# Patient Record
Sex: FEMALE | Race: ASIAN | NOT HISPANIC OR LATINO | URBAN - METROPOLITAN AREA
[De-identification: names, ages, dates, MRNs, and addresses within clinical notes are randomized per-mention and may not be internally consistent; named-entity substitution may affect disease eponyms.]

---

## 2021-12-22 ENCOUNTER — TELEPHONE (OUTPATIENT)
Dept: PAIN MEDICINE | Facility: CLINIC | Age: 76
End: 2021-12-22

## 2021-12-22 ENCOUNTER — TELEPHONE (OUTPATIENT)
Dept: PAIN MEDICINE | Facility: CLINIC | Age: 76
End: 2021-12-22
Payer: MEDICARE

## 2022-10-04 ENCOUNTER — HOSPITAL ENCOUNTER (OUTPATIENT)
Facility: HOSPITAL | Age: 77
Discharge: HOME | End: 2022-10-04
Attending: EMERGENCY MEDICINE | Admitting: STUDENT IN AN ORGANIZED HEALTH CARE EDUCATION/TRAINING PROGRAM
Payer: MEDICARE

## 2022-10-04 VITALS
HEIGHT: 65 IN | WEIGHT: 158.94 LBS | TEMPERATURE: 98 F | DIASTOLIC BLOOD PRESSURE: 85 MMHG | RESPIRATION RATE: 17 BRPM | HEART RATE: 96 BPM | BODY MASS INDEX: 26.48 KG/M2 | SYSTOLIC BLOOD PRESSURE: 175 MMHG | OXYGEN SATURATION: 96 %

## 2022-10-04 DIAGNOSIS — S06.5XAA SUBDURAL HEMATOMA: Primary | ICD-10-CM

## 2022-10-04 PROBLEM — G51.39 HEMIFACIAL SPASM: Status: ACTIVE | Noted: 2022-10-03

## 2022-10-04 PROBLEM — E11.9 DIABETES MELLITUS: Status: ACTIVE | Noted: 2022-10-04

## 2022-10-04 PROBLEM — H04.129 DRY EYE SYNDROME: Status: ACTIVE | Noted: 2022-10-04

## 2022-10-04 PROBLEM — G51.0 BELL'S PALSY: Status: ACTIVE | Noted: 2022-10-04

## 2022-10-04 LAB
ALBUMIN SERPL BCP-MCNC: 3.8 G/DL (ref 3.5–5.2)
ALP SERPL-CCNC: 83 U/L (ref 55–135)
ALT SERPL W/O P-5'-P-CCNC: 15 U/L (ref 10–44)
ANION GAP SERPL CALC-SCNC: 12 MMOL/L (ref 8–16)
AST SERPL-CCNC: 18 U/L (ref 10–40)
BASOPHILS # BLD AUTO: 0.03 K/UL (ref 0–0.2)
BASOPHILS NFR BLD: 0.3 % (ref 0–1.9)
BILIRUB SERPL-MCNC: 0.5 MG/DL (ref 0.1–1)
BUN SERPL-MCNC: 12 MG/DL (ref 8–23)
CALCIUM SERPL-MCNC: 8.9 MG/DL (ref 8.7–10.5)
CHLORIDE SERPL-SCNC: 102 MMOL/L (ref 95–110)
CO2 SERPL-SCNC: 25 MMOL/L (ref 23–29)
CREAT SERPL-MCNC: 0.8 MG/DL (ref 0.5–1.4)
DIFFERENTIAL METHOD: ABNORMAL
EOSINOPHIL # BLD AUTO: 0 K/UL (ref 0–0.5)
EOSINOPHIL NFR BLD: 0.2 % (ref 0–8)
ERYTHROCYTE [DISTWIDTH] IN BLOOD BY AUTOMATED COUNT: 12.6 % (ref 11.5–14.5)
EST. GFR  (NO RACE VARIABLE): >60 ML/MIN/1.73 M^2
GLUCOSE SERPL-MCNC: 135 MG/DL (ref 70–110)
HCT VFR BLD AUTO: 34.5 % (ref 37–48.5)
HCV AB SERPL QL IA: NORMAL
HGB BLD-MCNC: 11.6 G/DL (ref 12–16)
HIV 1+2 AB+HIV1 P24 AG SERPL QL IA: NORMAL
IMM GRANULOCYTES # BLD AUTO: 0.06 K/UL (ref 0–0.04)
IMM GRANULOCYTES NFR BLD AUTO: 0.6 % (ref 0–0.5)
INR PPP: 0.9 (ref 0.8–1.2)
LYMPHOCYTES # BLD AUTO: 1.3 K/UL (ref 1–4.8)
LYMPHOCYTES NFR BLD: 13.2 % (ref 18–48)
MCH RBC QN AUTO: 32.1 PG (ref 27–31)
MCHC RBC AUTO-ENTMCNC: 33.6 G/DL (ref 32–36)
MCV RBC AUTO: 96 FL (ref 82–98)
MONOCYTES # BLD AUTO: 0.5 K/UL (ref 0.3–1)
MONOCYTES NFR BLD: 5.2 % (ref 4–15)
NEUTROPHILS # BLD AUTO: 7.7 K/UL (ref 1.8–7.7)
NEUTROPHILS NFR BLD: 80.5 % (ref 38–73)
NRBC BLD-RTO: 0 /100 WBC
PLATELET # BLD AUTO: 315 K/UL (ref 150–450)
PMV BLD AUTO: 10.1 FL (ref 9.2–12.9)
POTASSIUM SERPL-SCNC: 4 MMOL/L (ref 3.5–5.1)
PROT SERPL-MCNC: 7 G/DL (ref 6–8.4)
PROTHROMBIN TIME: 9.8 SEC (ref 9–12.5)
RBC # BLD AUTO: 3.61 M/UL (ref 4–5.4)
SODIUM SERPL-SCNC: 139 MMOL/L (ref 136–145)
WBC # BLD AUTO: 9.5 K/UL (ref 3.9–12.7)

## 2022-10-04 PROCEDURE — G0378 HOSPITAL OBSERVATION PER HR: HCPCS

## 2022-10-04 PROCEDURE — 80053 COMPREHEN METABOLIC PANEL: CPT | Performed by: EMERGENCY MEDICINE

## 2022-10-04 PROCEDURE — 86803 HEPATITIS C AB TEST: CPT | Performed by: PHYSICIAN ASSISTANT

## 2022-10-04 PROCEDURE — 93005 ELECTROCARDIOGRAM TRACING: CPT

## 2022-10-04 PROCEDURE — 99218 PR INITIAL OBSERVATION CARE,LEVL I: CPT | Mod: GC,,, | Performed by: STUDENT IN AN ORGANIZED HEALTH CARE EDUCATION/TRAINING PROGRAM

## 2022-10-04 PROCEDURE — 85610 PROTHROMBIN TIME: CPT | Performed by: EMERGENCY MEDICINE

## 2022-10-04 PROCEDURE — 99285 PR EMERGENCY DEPT VISIT,LEVEL V: ICD-10-PCS | Mod: ,,, | Performed by: EMERGENCY MEDICINE

## 2022-10-04 PROCEDURE — 85025 COMPLETE CBC W/AUTO DIFF WBC: CPT | Performed by: EMERGENCY MEDICINE

## 2022-10-04 PROCEDURE — 99285 EMERGENCY DEPT VISIT HI MDM: CPT | Mod: 25

## 2022-10-04 PROCEDURE — 93010 EKG 12-LEAD: ICD-10-PCS | Mod: ,,, | Performed by: INTERNAL MEDICINE

## 2022-10-04 PROCEDURE — 99285 EMERGENCY DEPT VISIT HI MDM: CPT | Mod: ,,, | Performed by: EMERGENCY MEDICINE

## 2022-10-04 PROCEDURE — 87389 HIV-1 AG W/HIV-1&-2 AB AG IA: CPT | Performed by: PHYSICIAN ASSISTANT

## 2022-10-04 PROCEDURE — 99218 PR INITIAL OBSERVATION CARE,LEVL I: ICD-10-PCS | Mod: GC,,, | Performed by: STUDENT IN AN ORGANIZED HEALTH CARE EDUCATION/TRAINING PROGRAM

## 2022-10-04 PROCEDURE — 93010 ELECTROCARDIOGRAM REPORT: CPT | Mod: ,,, | Performed by: INTERNAL MEDICINE

## 2022-10-04 RX ORDER — METFORMIN HYDROCHLORIDE 1000 MG/1
1 TABLET ORAL 2 TIMES DAILY
COMMUNITY
Start: 2021-12-17 | End: 2022-12-16

## 2022-10-04 RX ORDER — TALC
6 POWDER (GRAM) TOPICAL NIGHTLY PRN
Status: DISCONTINUED | OUTPATIENT
Start: 2022-10-04 | End: 2022-10-04 | Stop reason: HOSPADM

## 2022-10-04 RX ORDER — AMLODIPINE BESYLATE 10 MG/1
10 TABLET ORAL DAILY
Status: DISCONTINUED | OUTPATIENT
Start: 2022-10-05 | End: 2022-10-04 | Stop reason: HOSPADM

## 2022-10-04 RX ORDER — AMLODIPINE BESYLATE 10 MG/1
1 TABLET ORAL DAILY
COMMUNITY
Start: 2021-12-17 | End: 2022-12-16

## 2022-10-04 RX ORDER — IBUPROFEN 200 MG
16 TABLET ORAL
Status: DISCONTINUED | OUTPATIENT
Start: 2022-10-04 | End: 2022-10-04 | Stop reason: HOSPADM

## 2022-10-04 RX ORDER — IBUPROFEN 200 MG
24 TABLET ORAL
Status: DISCONTINUED | OUTPATIENT
Start: 2022-10-04 | End: 2022-10-04 | Stop reason: HOSPADM

## 2022-10-04 RX ORDER — HYDRALAZINE HYDROCHLORIDE 20 MG/ML
10 INJECTION INTRAMUSCULAR; INTRAVENOUS EVERY 6 HOURS PRN
Status: DISCONTINUED | OUTPATIENT
Start: 2022-10-04 | End: 2022-10-04 | Stop reason: HOSPADM

## 2022-10-04 RX ORDER — CLONAZEPAM 1 MG/1
TABLET ORAL
COMMUNITY
Start: 2022-09-08 | End: 2023-03-06

## 2022-10-04 RX ORDER — CLONAZEPAM 1 MG/1
1 TABLET ORAL 3 TIMES DAILY PRN
Status: DISCONTINUED | OUTPATIENT
Start: 2022-10-04 | End: 2022-10-04 | Stop reason: HOSPADM

## 2022-10-04 RX ORDER — AMITRIPTYLINE HYDROCHLORIDE 50 MG/1
100 TABLET, FILM COATED ORAL NIGHTLY
Status: DISCONTINUED | OUTPATIENT
Start: 2022-10-04 | End: 2022-10-04 | Stop reason: HOSPADM

## 2022-10-04 RX ORDER — DOCUSATE SODIUM 100 MG/1
100 CAPSULE, LIQUID FILLED ORAL 2 TIMES DAILY
Status: DISCONTINUED | OUTPATIENT
Start: 2022-10-04 | End: 2022-10-04 | Stop reason: HOSPADM

## 2022-10-04 RX ORDER — LABETALOL HCL 20 MG/4 ML
10 SYRINGE (ML) INTRAVENOUS EVERY 6 HOURS PRN
Status: DISCONTINUED | OUTPATIENT
Start: 2022-10-04 | End: 2022-10-04 | Stop reason: HOSPADM

## 2022-10-04 RX ORDER — ACETAMINOPHEN 325 MG/1
650 TABLET ORAL EVERY 4 HOURS PRN
Status: DISCONTINUED | OUTPATIENT
Start: 2022-10-04 | End: 2022-10-04 | Stop reason: HOSPADM

## 2022-10-04 RX ORDER — GLUCAGON 1 MG
1 KIT INJECTION
Status: DISCONTINUED | OUTPATIENT
Start: 2022-10-04 | End: 2022-10-04 | Stop reason: HOSPADM

## 2022-10-04 RX ORDER — AMITRIPTYLINE HYDROCHLORIDE 100 MG/1
TABLET ORAL
COMMUNITY
Start: 2022-09-08 | End: 2023-09-07

## 2022-10-04 RX ORDER — NYSTATIN 100000 [USP'U]/G
POWDER TOPICAL
COMMUNITY
Start: 2022-09-08 | End: 2023-09-07

## 2022-10-04 RX ORDER — LEVETIRACETAM 500 MG/1
500 TABLET ORAL 2 TIMES DAILY
Qty: 60 TABLET | Refills: 2 | Status: SHIPPED | OUTPATIENT
Start: 2022-10-04 | End: 2023-10-04

## 2022-10-04 RX ORDER — ONDANSETRON 2 MG/ML
4 INJECTION INTRAMUSCULAR; INTRAVENOUS EVERY 6 HOURS PRN
Status: DISCONTINUED | OUTPATIENT
Start: 2022-10-04 | End: 2022-10-04 | Stop reason: HOSPADM

## 2022-10-04 RX ORDER — HYDROCHLOROTHIAZIDE 25 MG/1
TABLET ORAL
COMMUNITY
Start: 2022-06-08 | End: 2023-06-07

## 2022-10-04 RX ORDER — HEPARIN SODIUM 5000 [USP'U]/ML
5000 INJECTION, SOLUTION INTRAVENOUS; SUBCUTANEOUS EVERY 8 HOURS
Status: DISCONTINUED | OUTPATIENT
Start: 2022-10-05 | End: 2022-10-04 | Stop reason: HOSPADM

## 2022-10-04 RX ORDER — HYDROCHLOROTHIAZIDE 25 MG/1
25 TABLET ORAL DAILY
Status: DISCONTINUED | OUTPATIENT
Start: 2022-10-05 | End: 2022-10-04 | Stop reason: HOSPADM

## 2022-10-04 NOTE — SUBJECTIVE & OBJECTIVE
(Not in a hospital admission)      Review of patient's allergies indicates:   Allergen Reactions    Codeine Other (See Comments)     Per patient      Ketorolac Other (See Comments)     Reaction(s): Unknown      Sumatriptan Other (See Comments)     Reaction(s): Unknown         History reviewed. No pertinent past medical history.  History reviewed. No pertinent surgical history.  Family History    None       Tobacco Use    Smoking status: Not on file    Smokeless tobacco: Not on file   Substance and Sexual Activity    Alcohol use: Not on file    Drug use: Not on file    Sexual activity: Not on file     Review of Systems: see HPI for pertinent positives and negatives.   Objective:     Weight: 72.1 kg (158 lb 15.2 oz)  Body mass index is 26.45 kg/m².  Vital Signs (Most Recent):  Temp: 98.7 °F (37.1 °C) (10/04/22 1608)  Pulse: 92 (10/04/22 1501)  Resp: 18 (10/04/22 1330)  BP: (!) 159/80 (10/04/22 1501)  SpO2: 95 % (10/04/22 1501)   Vital Signs (24h Range):  Temp:  [97.4 °F (36.3 °C)-98.7 °F (37.1 °C)] 98.7 °F (37.1 °C)  Pulse:  [92-98] 92  Resp:  [18] 18  SpO2:  [95 %-98 %] 95 %  BP: (155-162)/(71-87) 159/80                          Physical Exam    Neurosurgery Physical Exam    GENERAL: resting comfortably  HEENT: NC, PERRL, mucous membranes moist  NECK: supple, trachea midline  CV: normal capillary refill  PULM: aerating well, symmetric expansion, no distress  ABD: soft, NT, ND  EXT: no c/c/e    NEURO:    AAO x 3  CN II-XII grossly intact except 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 **          All pertinent labs from  the last 24 hours have been reviewed.    Significant Diagnostics:  I have reviewed all pertinent imaging results/findings within the past 24 hours.  CT Head Without Contrast    Result Date: 10/4/2022  Subdural hemorrhage along the left side of the falx/tentorium.  The prior studies not available for direct comparison.  No significant midline shift or herniation. Electronically signed by: Percy Peace Date:    10/04/2022 Time:    13:00    CT Cervical Spine Without Contrast    Result Date: 10/4/2022  1. No fracture. 2. Degenerative changes of the cervical spine detailed above. 3. Multiple subcentimeter thyroid nodules. Electronically signed by: Jey Santiago MD Date:    10/04/2022 Time:    13:31

## 2022-10-04 NOTE — ED TRIAGE NOTES
Patient arrived via EMS as transfer from [facility]. Patient reports sleeping then suddenly waking up on the floor with blood on the floor. Patient also reports the swelling to the left side of the face and current state of speaking is her baseline.

## 2022-10-04 NOTE — ASSESSMENT & PLAN NOTE
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 CTH from OSH reviewed; there is a 6mm interhemispheric aSDH without overlying skull fracture. CTH obtained at [facility] approximately 6 hours later is grossly stable. CT Csp negative for acute process.     --Patient admitted to observation on telemetry      -q1h neurochecks in ICU, q2h neurochecks in stepdown, q4h neurochecks on floor  --All labs and diagnostics reviewed  --Hold anti-plt/coag medications  --SBP <140   --Na >135  --Keppra 500 BID  --HOB >30  --Follow-up full pre-op labs CBC/CMP/PT-INR/PTT/T&S  --Regular diet  --Continue to monitor clinically, notify NSGY immediately with any changes in neuro status    Dispo: Admit to obs

## 2022-10-04 NOTE — CONSULTS
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 Patient reportedly fell overnight while walking to the bathroom and struck her head on a bedside table. She is amnestic to the event, but denies LOC, fever/chills, HA, vision/hearing changes, dysphagia, dysarthria, nausea/vomiting, new-onset weakness or sensory change, bowel/bladder changes.  at bedside states pt got up at 4 am and was walking to the bathroom when she lost balance causing ehr to fall and hit her head on the dresser. CTH from OSH reviewed; there is a 6mm interhemispheric aSDH without overlying skull fracture. CTH obtained at [facility] approximately 6 hours later is grossly stable. CT Csp negative for acute process.       (Not in a hospital admission)      Review of patient's allergies indicates:   Allergen Reactions    Codeine Other (See Comments)     Per patient      Ketorolac Other (See Comments)     Reaction(s): Unknown      Sumatriptan Other (See Comments)     Reaction(s): Unknown         History reviewed. No pertinent past medical history.  History reviewed. No pertinent surgical history.  Family History    None       Tobacco Use    Smoking status: Not on file    Smokeless tobacco: Not on file   Substance and Sexual Activity    Alcohol use: Not on file    Drug use: Not on file    Sexual activity: Not on file     Review of Systems: see HPI for pertinent positives and negatives.   Objective:     Weight: 72.1 kg (158 lb 15.2 oz)  Body mass index is 26.45 kg/m².  Vital Signs (Most Recent):  Temp: 98.7 °F (37.1 °C) (10/04/22 1608)  Pulse: 92 (10/04/22 1501)  Resp: 18 (10/04/22 1330)  BP: (!) 159/80 (10/04/22 1501)  SpO2: 95 %  (10/04/22 1501)   Vital Signs (24h Range):  Temp:  [97.4 °F (36.3 °C)-98.7 °F (37.1 °C)] 98.7 °F (37.1 °C)  Pulse:  [92-98] 92  Resp:  [18] 18  SpO2:  [95 %-98 %] 95 %  BP: (155-162)/(71-87) 159/80                          Physical Exam    Neurosurgery Physical Exam    GENERAL: resting comfortably  HEENT: NC, PERRL, mucous membranes moist  NECK: supple, trachea midline  CV: normal capillary refill  PULM: aerating well, symmetric expansion, no distress  ABD: soft, NT, ND  EXT: no c/c/e    NEURO:    AAO x 3  CN II-XII grossly intact except 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 **          All pertinent labs from the last 24 hours have been reviewed.    Significant Diagnostics:  I have reviewed all pertinent imaging results/findings within the past 24 hours.  CT Head Without Contrast    Result Date: 10/4/2022  Subdural hemorrhage along the left side of the falx/tentorium.  The prior studies not available for direct comparison.  No significant midline shift or herniation. Electronically signed by: Percy Peace Date:    10/04/2022 Time:    13:00    CT Cervical Spine Without Contrast    Result Date: 10/4/2022  1. No fracture. 2. Degenerative changes of the cervical spine detailed above. 3. Multiple subcentimeter thyroid nodules. 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 CTH from OSH reviewed; there is a 6mm interhemispheric aSDH without  overlying skull fracture. [place] [facility] [place] 6 hours later is grossly stable. CT Csp negative for acute process.     --Patient admitted to observation on telemetry      -q1h neurochecks in ICU, q2h neurochecks in stepdown, q4h neurochecks on floor  --All labs and diagnostics reviewed  --Hold anti-plt/coag medications  --SBP <140   --Na >135  --Keppra 500 BID  --HOB >30  --Follow-up full pre-op labs CBC/CMP/PT-INR/PTT/T&S  --Regular diet  --Continue to monitor clinically, notify NSGY immediately with any changes in neuro status    Dispo: Admit to obs          Thank you for your consult. I will follow-up with patient. Please contact us if you have any additional questions.    Patricio Morin MD  Neurosurgery  [facility] - Emergency Dept

## 2022-10-04 NOTE — ED PROVIDER NOTES
Encounter Date: 10/4/2022       History     Chief Complaint   Patient presents with    transfer     From [facility]. Had a fall this morning, laceration to posterior aspect of head. Known subdural, here for neuro consult.      Monica Morin is a 77 F hx of left hemifacial spasm transferred from [facility] for acute SDH.  Patient states she went to bed normally last night.  She woke up this morning on the ground with bleeding from the head.  She does not remember falling out of bed.  No blood thinners or ASA.       at bedside states pt got up at 4 am and was walking to the bathroom when she lost balance causing ehr to fall and hit her head on the dresser.  She did not lose consciousness.     Denies any symptoms such as headache, vision change, nausea or vomiting. No unilateral weakness, numbness or tingling.  No neck pain    Review of patient's allergies indicates:   Allergen Reactions    Codeine Other (See Comments)     Per patient      Ketorolac Other (See Comments)     Reaction(s): Unknown      Sumatriptan Other (See Comments)     Reaction(s): Unknown       History reviewed. No pertinent past medical history.  History reviewed. No pertinent surgical history.  History reviewed. No pertinent family history.     Review of Systems   Constitutional:  Negative for chills and fever.   HENT:  Negative for sore throat.    Respiratory:  Negative for cough and shortness of breath.    Cardiovascular:  Negative for chest pain.   Gastrointestinal:  Negative for nausea.   Genitourinary:  Negative for dysuria.   Musculoskeletal:  Negative for back pain.   Skin:  Negative for rash.   Neurological:  Negative for weakness.   Hematological:  Does not bruise/bleed easily.     Physical Exam     Initial Vitals [10/04/22 1141]   BP Pulse Resp Temp SpO2   (!) 156/87 97 18 97.4 °F (36.3 °C) 98 %      MAP       --         Physical Exam    Nursing note and vitals reviewed.  Constitutional: She appears  well-developed and well-nourished. No distress.   HENT:   Mouth/Throat: Oropharynx is clear and moist.   (+) left posterior scalp laceration with staples in place     Eyes: Conjunctivae are normal. No scleral icterus.   Neck: No JVD present.   Cardiovascular:  Normal rate, regular rhythm and intact distal pulses.           Pulmonary/Chest: Breath sounds normal. No respiratory distress. She has no wheezes. She has no rales.   Abdominal: Abdomen is soft. Bowel sounds are normal. She exhibits no distension. There is no abdominal tenderness. There is no rebound.   Musculoskeletal:         General: No edema.     Lymphadenopathy:     She has no cervical adenopathy.   Neurological: She is alert and oriented to person, place, and time. She has normal strength. No cranial nerve deficit or sensory deficit.   (+) complete left facial paralysis   Skin: Skin is warm. No rash noted.       ED Course   Procedures  Labs Reviewed   CBC W/ AUTO DIFFERENTIAL - Abnormal; Notable for the following components:       Result Value    RBC 3.61 (*)     Hemoglobin 11.6 (*)     Hematocrit 34.5 (*)     MCH 32.1 (*)     Immature Granulocytes 0.6 (*)     Immature Grans (Abs) 0.06 (*)     Gran % 80.5 (*)     Lymph % 13.2 (*)     All other components within normal limits   COMPREHENSIVE METABOLIC PANEL - Abnormal; Notable for the following components:    Glucose 135 (*)     All other components within normal limits   HIV 1 / 2 ANTIBODY    Narrative:     Release to patient->Immediate   HEPATITIS C ANTIBODY    Narrative:     Release to patient->Immediate   PROTIME-INR   URINALYSIS, REFLEX TO URINE CULTURE          Imaging Results              CT Cervical Spine Without Contrast (Final result)  Result time 10/04/22 13:31:26      Final result by Jey Santiago MD (10/04/22 13:31:26)                   Impression:      1. No fracture.  2. Degenerative changes of the cervical spine detailed above.  3. Multiple subcentimeter thyroid  nodules.      Electronically signed by: Jey Santiago MD  Date:    10/04/2022  Time:    13:31               Narrative:    EXAMINATION:  CT CERVICAL SPINE WITHOUT CONTRAST    CLINICAL HISTORY:  Spine fracture, cervical, traumatic;    TECHNIQUE:  Low dose axial images, sagittal and coronal reformations were performed though the cervical spine.  Contrast was not administered.    COMPARISON:  None    FINDINGS:  Alignment: Straightening of lordosis.    Vertebrae: No fracture.  No lytic or blastic lesion.    Discs: Moderate disc height loss at C5-C6.    C1-2: Dens is intact.  Pre-dens space is maintained.    Skull base and craniocervical junction: Normal.    Degenerative findings:    C2-C3: No spinal canal stenosis or neural foraminal narrowing.    C3-C4: Uncovertebral spurring results in mild right neural foraminal narrowing.    C4-C5: No spinal canal stenosis or neural foraminal narrowing.    C5-C6: Posterior disc osteophyte complex with uncovertebral spurring result in mild spinal canal stenosis and mild left neural foraminal narrowing.    C6-C7: Mild facet arthropathy.  No spinal canal stenosis or neural foraminal narrowing.    C7-T1: No spinal canal stenosis or neural foraminal narrowing.    Paraspinal muscles & soft tissues: Multiple inter subcentimeter thyroid nodules bilaterally.                                       CT Head Without Contrast (Final result)  Result time 10/04/22 13:00:52      Final result by Percy Peace MD (10/04/22 13:00:52)                   Impression:      Subdural hemorrhage along the left side of the falx/tentorium.  The prior studies not available for direct comparison.  No significant midline shift or herniation.      Electronically signed by: Percy Peace  Date:    10/04/2022  Time:    13:00               Narrative:    EXAMINATION:  CT HEAD WITHOUT CONTRAST    CLINICAL HISTORY:  Subdural hemorrhage;transfer- reported 5 mm posterior SDH;    TECHNIQUE:  Low dose axial images were  obtained through the head.  Coronal and sagittal reformations were also performed. Contrast was not administered.    COMPARISON:  None.    FINDINGS:  Subdural hemorrhage is noted along the left side of the falx extending along the left tentorium measuring 6 mm in width.  The prior study is not available for direct comparison.    No significant midline shift or herniation.    Evaluation of the remainder of the brain demonstrates prior right frontal craniotomy.  There is no evidence of hydrocephalus mass effect or acute territorial infarct.  The brain parenchyma overall maintains normal attenuation.    No evidence of acute calvarial fracture.  Presumed chronic deformity of the medial wall of the right orbit/lamina papyracea with orbital fat protruding into the ethmoid air cells.    The visualized sinuses and mastoid air cells are otherwise clear.                                       Medications   glucose chewable tablet 16 g (has no administration in time range)   glucose chewable tablet 16 g (has no administration in time range)   glucose chewable tablet 24 g (has no administration in time range)   glucagon (human recombinant) injection 1 mg (has no administration in time range)   dextrose 10% bolus 125 mL (has no administration in time range)   dextrose 10% bolus 250 mL (has no administration in time range)   heparin (porcine) injection 5,000 Units (has no administration in time range)   acetaminophen tablet 650 mg (has no administration in time range)   docusate sodium capsule 100 mg (has no administration in time range)   ondansetron injection 4 mg (has no administration in time range)   melatonin tablet 6 mg (has no administration in time range)   amitriptyline tablet 100 mg (has no administration in time range)   amLODIPine tablet 10 mg (has no administration in time range)   clonazePAM tablet 1 mg (has no administration in time range)   hydroCHLOROthiazide tablet 25 mg (has no administration in time range)    labetalol 20 mg/4 mL (5 mg/mL) IV syring (has no administration in time range)   hydrALAZINE injection 10 mg (has no administration in time range)     Medical Decision Making:   Initial Assessment:   Emergent evaluation 77-year-old female presenting today as a transfer from outside hospital for subdural hematoma.  GCS 15  Mildly hypertensive    Differential Diagnosis:   SDH electrolyte derangement, arrhythmia  Clinical Tests:   Lab Tests: Reviewed and Ordered  Radiological Study: Ordered and Reviewed  Medical Tests: Ordered and Reviewed  ED Management:  - labs  - EKG  - repeat head CT  - C-spine           ED Course as of 10/04/22 1943   Tue Oct 04, 2022   1306 Impression:     Subdural hemorrhage along the left side of the falx/tentorium.  The prior studies not available for direct comparison.  No significant midline shift or herniation.        Electronically signed by: Percy Peace  Date:                                            10/04/2022  Time:                                           13:00 [GM]   1315 NSY paged, in the OR   [GM]   1400 NSY at bedside for evaluation. [GM]   1405 Impression:     1. No fracture.  2. Degenerative changes of the cervical spine detailed above.  3. Multiple subcentimeter thyroid nodules.        Electronically signed by: Jey Santiago MD  Date:                                            10/04/2022  Time:                                           13:31 [GM]   1500 Called neurosurgery again, left a voicemail [GM]   1528 Called Neurosurgery again, talked to the circulator nurse.  They are still in OR.   Will send someone down shortly.  [GM]   1600 NSY at bedside for evaluation.  Pending recommendations.     [GM]   1745 Neurosurgery has admitted the patient [GM]      ED Course User Index  [GM] Chelsey Milan MD                 Clinical Impression:   Final diagnoses:  [S06.5XAA] Subdural hematoma (Primary)        ED Disposition Condition    Observation                 Chelsey Milan,  MD  10/04/22 1936       Chelsey Milan MD  10/04/22 1943

## 2022-10-04 NOTE — HPI
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 Patient reportedly fell overnight while walking to the bathroom and struck her head on a bedside table. She is amnestic to the event, but denies LOC, fever/chills, HA, vision/hearing changes, dysphagia, dysarthria, nausea/vomiting, new-onset weakness or sensory change, bowel/bladder changes.  at bedside states pt got up at 4 am and was walking to the bathroom when she lost balance causing ehr to fall and hit her head on the dresser. CTH from OSH reviewed; there is a 6mm interhemispheric aSDH without overlying skull fracture. CTH obtained at [facility] approximately 6 hours later is grossly stable. CT Csp negative for acute process.

## 2022-10-05 ENCOUNTER — TELEPHONE (OUTPATIENT)
Dept: NEUROSURGERY | Facility: CLINIC | Age: 77
End: 2022-10-05
Payer: MEDICARE

## 2022-10-05 DIAGNOSIS — I62.00 NONTRAUMATIC SUBDURAL HEMORRHAGE, UNSPECIFIED: Primary | ICD-10-CM

## 2022-10-05 DIAGNOSIS — S06.5XAA SUBDURAL HEMATOMA: ICD-10-CM

## 2022-10-05 NOTE — NURSING
Patient discharge home with spouse. Discharge summary given and reviewed with patient and spouse. Cardiac monitor and PIV discontinued. All questions and concerns addressed. Patient transported via wheelchair by transported.

## 2022-10-05 NOTE — TELEPHONE ENCOUNTER
Lm informing pt that scheduled 4 wk f/u appt for 11/2 with Dr. Gonsalez's Physician Assistant, Jesenia Fitzgerald. Scheduled a CT at the imaging center at 9:30a and follow up in clinic at 10:30a. Informed will also said letter in the mail with this appt information to address on file and call back our office with any questions.

## 2022-10-05 NOTE — DISCHARGE SUMMARY
[facility] - Neurosurgery ([facility])  Neurosurgery  Discharge Summary      Patient Name: Monica Morin  MRN: [id number]  Admission Date: 10/4/2022  Hospital Length of Stay: 0 days  Discharge Date and Time:  10/05/2022 2:16 PM  Attending Physician: Shruthi Gonsalez MD   Discharging Provider: Christoph Butts MD  Primary Care Provider: No primary care provider on file.    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 Patient reportedly fell overnight while walking to the bathroom and struck her head on a bedside table. She is amnestic to the event, but denies LOC, fever/chills, HA, vision/hearing changes, dysphagia, dysarthria, nausea/vomiting, new-onset weakness or sensory change, bowel/bladder changes.  at bedside states pt got up at 4 am and was walking to the bathroom when she lost balance causing ehr to fall and hit her head on the dresser. CTH from OSH reviewed; there is a 6mm interhemispheric aSDH without overlying skull fracture. [facility] approximately 6 hours later is grossly stable. CT Csp negative for acute process.       Hospital Course: Repeat CT stable. She would like to go home. Will arrange 4 weeks follow up with head CT    Goals of Care Treatment Preferences:  Code Status: Full Code      Consults:   Consults (From admission, onward)          Status Ordering Provider     Inpatient consult to Neurosurgery  Once        Provider:  (Not yet assigned)    ELEAZAR Mondragon            Significant Diagnostic Studies: Radiology: CT scan:  HEAD X2    Pending Diagnostic Studies:       None          Final Active Diagnoses:    Diagnosis Date Noted POA    PRINCIPAL PROBLEM:  Subdural hematoma [S06.5XAA] 10/04/2022 Yes      Problems Resolved During this Admission:      Discharged Condition: good     Disposition: Home or Self Care    Follow Up:   Follow-up Information       Shruthi Gonsalez MD. Schedule an appointment as soon as  possible for a visit in 1 month(s).    Specialties: Neurosurgery, Pediatric Neurosurgery  Why: with repeat CT head  Contact information:  [address] [facility]  Department of Neurosurgery - 7th Floor  [facility] [address]  [phone]                           Patient Instructions:      CT Chest Without Contrast   Standing Status: Future Standing Exp. Date: 10/04/23     Order Specific Question Answer Comments   May the Radiologist modify the order per protocol to meet the clinical needs of the patient? Yes      Diet Adult Regular     Notify your health care provider if you experience any of the following:  severe persistent headache     Notify your health care provider if you experience any of the following:  increased confusion or weakness     Notify your health care provider if you experience any of the following:  persistent dizziness, light-headedness, or visual disturbances     Activity as tolerated     Medications:  Reconciled Home Medications:      Medication List        START taking these medications      levETIRAcetam 500 MG Tab  Commonly known as: KEPPRA  Take 1 tablet (500 mg total) by mouth 2 (two) times daily.            CONTINUE taking these medications      amitriptyline 100 MG tablet  Commonly known as: ELAVIL  TAKE ONE TABLET BY MOUTH EVERY DAY, # 90 EA, 3 total refill(s), Acute, JOSE [Federal Rx: #90 last filled 09/08/22]     amLODIPine 10 MG tablet  Commonly known as: NORVASC  Take 1 tablet by mouth once daily.     clonazePAM 1 MG tablet  Commonly known as: KlonoPIN  TAKE ONE TABLET BY MOUTH THREE TIMES A DAY AS NEEDED FOR CHRONIC FACIAL PAIN, # 180 EA, 3 total refill(s), Acute, JOSE [Federal Rx: #180 last filled 09/08/22]     hydroCHLOROthiazide 25 MG tablet  Commonly known as: HYDRODIURIL  TAKE 1 TABLET BY MOUTH DAILY, # 90 EA, 2 total refill(s), Acute, JOSE [Federal Rx: #90 last filled 09/07/22]     metFORMIN 1000 MG tablet  Commonly known as: GLUCOPHAGE  Take 1 tablet by mouth 2 (two) times  daily.     nystatin powder  Commonly known as: MYCOSTATIN  APPLY TO AFFECTED AREAS 2 TO 3 TIMES DAILY AS NEEDED AS DIRECTED, # 45 g, 2 total refill(s), Acute, JOSE [Federal Rx: #45 last filled 09/08/22]              Christoph Butts MD  Neurosurgery  [facility] - Neurosurgery ([facility])

## 2022-10-05 NOTE — DISCHARGE INSTRUCTIONS
-Activity as tolerated  -If you experience; weaknesses or confusion , please call clinic   -Please continue on home medication  -Tylenol for pain and narcotic as needed  -You will be contacted to arrange a follow up appointment in neurosurgery clinic   -Please avoid all blood thinning medications as well as NSAIDs like ibuprofen, naproxen, or indamethecin as well as Aspirin as these may increase your risk of bleeding  -For questions or concerns call please call clinic

## 2022-10-27 ENCOUNTER — TELEPHONE (OUTPATIENT)
Dept: NEUROSURGERY | Facility: CLINIC | Age: 77
End: 2022-10-27
Payer: MEDICARE

## 2022-10-27 NOTE — TELEPHONE ENCOUNTER
Lm informing pt  that the appt [facility] in [facility] is scheduled incorrectly. Informed will cancel that appt and reschedule appt for 3:00p with Dr. Gonsalez on 11/14. Apologized for the confusion and informed can call office back with any questions   Reason for Admission:   abd pain                    RUR Score:   16%               PCP: First and Last name:   Diogenes Maharaj MD     Name of Practice:    Are you a current patient: Yes/No: yes   Approximate date of last visit: \"last Fri\"   Can you participate in a virtual visit if needed: no    Do you (patient/family) have any concerns for transition/discharge? [facility]                Plan for utilizing home vishal:     Current with [address]    Current Advanced Directive/Advance Care Plan:  Full Code      Healthcare Decision Maker:   Click here to complete [address] including selection of the Healthcare Decision Maker Relationship (ie \"Primary\")            Primary Decision Maker: Liz Christina - [phone]    Transition of Care Plan:    Pt is expected to transfer to [facility] for continued care. done

## 2022-11-08 ENCOUNTER — TELEPHONE (OUTPATIENT)
Dept: NEUROSURGERY | Facility: CLINIC | Age: 77
End: 2022-11-08
Payer: MEDICARE

## 2022-11-08 NOTE — TELEPHONE ENCOUNTER
Left VM and Requested call back to discuss    ----- Message from Kely Restrepo sent at 11/8/2022 11:22 AM CST -----  Monica Morin  calling stating that the CT scan will be done at another facility on Thursday and feels that the results will not ne in before the appt on 11/14/22 and wanted to reschedule the appt,  call back to advise [phone]

## 2022-11-08 NOTE — TELEPHONE ENCOUNTER
Spoke w pt  and rescheduled 11/14 appt with Dr. Gonsalez to 12/6 with Jesenia Fitzgerald.  wanted to move out appt because he said he would not be able to get CT on disc before 11/14 appt. They preferred to get CT done at [facility] closer to their home.  confirmed time and date worked with their schedule

## 2022-11-16 ENCOUNTER — TELEPHONE (OUTPATIENT)
Dept: NEUROSURGERY | Facility: CLINIC | Age: 77
End: 2022-11-16
Payer: MEDICARE

## 2022-11-16 NOTE — TELEPHONE ENCOUNTER
Spoke w pt  and rescheduled 12/6 appt with Jesenia to be virtual for pt convenience. Pt  confirmed he will mail the CT on disc to the clinic before the appt

## 2022-11-28 ENCOUNTER — PATIENT MESSAGE (OUTPATIENT)
Dept: NEUROSURGERY | Facility: CLINIC | Age: 77
End: 2022-11-28
Payer: MEDICARE

## 2022-12-06 ENCOUNTER — OFFICE VISIT (OUTPATIENT)
Dept: NEUROSURGERY | Facility: CLINIC | Age: 77
End: 2022-12-06
Payer: MEDICARE

## 2022-12-06 DIAGNOSIS — S06.5XAA SUBDURAL HEMATOMA: Primary | ICD-10-CM

## 2022-12-06 PROCEDURE — 99213 OFFICE O/P EST LOW 20 MIN: CPT | Mod: 95,,, | Performed by: PHYSICIAN ASSISTANT

## 2022-12-06 PROCEDURE — 99213 PR OFFICE/OUTPT VISIT, EST, LEVL III, 20-29 MIN: ICD-10-PCS | Mod: 95,,, | Performed by: PHYSICIAN ASSISTANT

## 2022-12-06 NOTE — PROGRESS NOTES
The patient location is: home  The chief complaint leading to consultation is: follow up    Visit type: audiovisual    Face to Face time with patient: 10 minutes of total time spent on the encounter, which includes face to face time and non-face to face time preparing to see the patient (eg, review of tests), Obtaining and/or reviewing separately obtained history, Documenting clinical information in the electronic or other health record, Independently interpreting results (not separately reported) and communicating results to the patient/family/caregiver, or Care coordination (not separately reported).         Each patient to whom he or she provides medical services by telemedicine is:  (1) informed of the relationship between the physician and patient and the respective role of any other health care provider with respect to management of the patient; and (2) notified that he or she may decline to receive medical services by telemedicine and may withdraw from such care at any time.    Notes:     Neurosurgery  Established Patient    SUBJECTIVE:     History of Present Illness:  Monica Morin is a 77 y.o. 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 Patient remained neurologically intact and imaging was stable. She was discharged home with follow up. Patient and her  both report she is doing well since trauma and deny headaches, increased confusion, acute focal weakness, visual disturbances, seizure activity.     Review of patient's allergies indicates:   Allergen Reactions    Codeine Other (See Comments)     Per patient      Ketorolac Other (See Comments)     Reaction(s): Unknown      Sumatriptan Other (See Comments)     Reaction(s): Unknown         Current Outpatient Medications   Medication Sig Dispense Refill    amitriptyline (ELAVIL) 100 MG tablet   TAKE ONE TABLET BY MOUTH EVERY DAY, # 90 EA, 3 total refill(s), Acute, JOSE [Federal Rx:  #90 last filled 09/08/22]      amLODIPine (NORVASC) 10 MG tablet Take 1 tablet by mouth once daily.      clonazePAM (KLONOPIN) 1 MG tablet   TAKE ONE TABLET BY MOUTH THREE TIMES A DAY AS NEEDED FOR CHRONIC FACIAL PAIN, # 180 EA, 3 total refill(s), Acute, JOSE [Federal Rx: #180 last filled 09/08/22]      hydroCHLOROthiazide (HYDRODIURIL) 25 MG tablet   TAKE 1 TABLET BY MOUTH DAILY, # 90 EA, 2 total refill(s), Acute, JOSE [Federal Rx: #90 last filled 09/07/22]      levETIRAcetam (KEPPRA) 500 MG Tab Take 1 tablet (500 mg total) by mouth 2 (two) times daily. 60 tablet 2    metFORMIN (GLUCOPHAGE) 1000 MG tablet Take 1 tablet by mouth 2 (two) times daily.      nystatin (MYCOSTATIN) powder   APPLY TO AFFECTED AREAS 2 TO 3 TIMES DAILY AS NEEDED AS DIRECTED, # 45 g, 2 total refill(s), Acute, JOSE [Federal Rx: #45 last filled 09/08/22]       No current facility-administered medications for this visit.       No past medical history on file.  No past surgical history on file.  Family History    None       Social History     Socioeconomic History    Marital status:        Review of Systems   Constitutional:  Negative for chills, fatigue and fever.   Eyes:  Negative for photophobia and visual disturbance.   Respiratory:  Negative for cough and shortness of breath.    Cardiovascular:  Negative for chest pain, palpitations and leg swelling.   Gastrointestinal:  Negative for constipation, diarrhea, nausea and vomiting.   Genitourinary:  Negative for difficulty urinating, dysuria, frequency and urgency.   Musculoskeletal:  Negative for back pain, gait problem and neck pain.   Neurological:  Positive for weakness (facial weakness (baseline)). Negative for dizziness, seizures, speech difficulty, numbness and headaches.   Psychiatric/Behavioral:  Negative for confusion. The patient is not nervous/anxious.      OBJECTIVE:     Vital Signs     There is no height or weight on file to calculate BMI.    Neurosurgery Physical  Exam  General: well developed, well nourished, no distress.   Head: normocephalic  Neurologic: Alert and oriented. Thought content appropriate.  Speech is slow but fluent  Cranial nerves: left facial weakness (baseline), otherwise CN II-XII grossly intact.   Eyes: EOMI appear grossly intact  Pulmonary: no signs of respiratory distress  Motor Strength:Moves all extremities spontaneously with good tone. BUE and BLE are at least 3/5. No abnormal movements seen.     Diagnostic Results:  I have personally reviewed imaging and agree with the findings    CT head wo contrast (OSH disc 11/10/22): close to complete resolution of previous parafalcine SDH with no acute hemorrhage.     ASSESSMENT/PLAN:     78 y/o female with traumatic parafalcine SDH who presents for hospital follow up, repeat imaging with resolution of SDH    -Okay to resume any blood thinning agents   -I would like the patient to follow-up in clinic as needed. I have encouraged hher to contact the clinic with any questions, concerns, or adverse clinical changes. She verbalized understanding.     Jesenia Fitzgerald PA-C  Neurosurgery             Note dictated with voice recognition software, please excuse any grammatical errors.

## 2023-01-25 ENCOUNTER — TELEPHONE (OUTPATIENT)
Dept: NEUROSURGERY | Facility: CLINIC | Age: 78
End: 2023-01-25
Payer: MEDICARE